# Patient Record
Sex: MALE | URBAN - METROPOLITAN AREA
[De-identification: names, ages, dates, MRNs, and addresses within clinical notes are randomized per-mention and may not be internally consistent; named-entity substitution may affect disease eponyms.]

---

## 2024-06-13 ENCOUNTER — LAB OUTSIDE AN ENCOUNTER (OUTPATIENT)
Dept: URBAN - METROPOLITAN AREA CLINIC 40 | Facility: CLINIC | Age: 49
End: 2024-06-13

## 2024-06-13 ENCOUNTER — DASHBOARD ENCOUNTERS (OUTPATIENT)
Age: 49
End: 2024-06-13

## 2024-06-13 ENCOUNTER — OFFICE VISIT (OUTPATIENT)
Dept: URBAN - METROPOLITAN AREA CLINIC 40 | Facility: CLINIC | Age: 49
End: 2024-06-13
Payer: COMMERCIAL

## 2024-06-13 VITALS
WEIGHT: 244.2 LBS | HEART RATE: 86 BPM | TEMPERATURE: 98.2 F | SYSTOLIC BLOOD PRESSURE: 120 MMHG | HEIGHT: 70 IN | DIASTOLIC BLOOD PRESSURE: 74 MMHG | BODY MASS INDEX: 34.96 KG/M2

## 2024-06-13 DIAGNOSIS — Z87.19 HISTORY OF HEMORRHOIDS: ICD-10-CM

## 2024-06-13 DIAGNOSIS — Z12.11 ENCOUNTER FOR SCREENING COLONOSCOPY: ICD-10-CM

## 2024-06-13 PROCEDURE — 99203 OFFICE O/P NEW LOW 30 MIN: CPT | Performed by: PHYSICIAN ASSISTANT

## 2024-06-13 RX ORDER — VALSARTAN AND HYDROCHLOROTHIAZIDE 160; 25 MG/1; MG/1
1 TABLET TABLET, FILM COATED ORAL ONCE A DAY
Status: ACTIVE | COMMUNITY

## 2024-06-13 RX ORDER — ESCITALOPRAM OXALATE 10 MG/1
1 TABLET TABLET, FILM COATED ORAL ONCE A DAY
Status: ACTIVE | COMMUNITY

## 2024-06-13 RX ORDER — ATORVASTATIN CALCIUM 80 MG/1
1 TABLET TABLET, FILM COATED ORAL ONCE A DAY
Status: ACTIVE | COMMUNITY

## 2024-06-13 NOTE — HPI-TODAY'S VISIT:
Mr. Bains is a 48-year-old white male presents to the office today to schedule his first colorectal cancer screening.  He has a history of hypertension, hypercholesterolemia, obesity.  No family history of colorectal cancer.  He has no complaint of abdominal pain changes in bowel habits but has had occasional episodes of painless rectal bleeding and does give a history of hemorrhoids.  Good appetite weight stable.  He remains very active, working in construction/ electrical work. PATIENT INSTRUCTIONS    Treatment:   Continue icing.        After Injection Instruction: keep the bandaid on for 24 hours, clean and dry.  Remove about 5 pm tomorrow and can then take a shower.  You have been given an injection (shot) at the [facility] department.  Injections are given into joints, tendons, or soft tissue for the treatment of pain and inflammation.  The medicine is a corticosteroid, but is often called a steroid or cortisone shot.  The steroid used was Kenalog.  A fast acting pain killer such as Lidocaine may be injected with the steroid to dull the pain for a few hours.  A long acting anesthetic, such as Marcaine, may also be injected with the steroid.  It may last up to 30 hours or wear off as soon as 6 hours.        · The steroid begins to work in 24 to 48 hours and may take 2 weeks to reach full effect.     · Take all of your regular medications, including pain medicine (unless instructed otherwise).     · Some increased pain may occur in the first 24 hours after the injection.  You may apply a cold pack or ice to the injected area for 15 to 20 minutes every 1 to 2 hours for 24 hours to lessen the pain.     · For people with diabetes, your blood sugar may be increased for 1 to 2 days. If you have any questions regarding your blood sugar, please contact your primary care physician.       Call your Orthopaedic physician if you develop any of the following symptoms:  · Fever  · Increased redness of injection site  · If it is not better in 2 weeks         Follow-Up:  Please make an appointment with  Dr. Kelley Torres in 2 weeks.  Your knee will also be evaluated at this time.             Time left: 2/5/2020 4:54 PM     Next appointment:        Location:        Provider:         Please note: 24 hour notice for cancellation of appointment is required.    You may receive a survey in the mail, or via the e-mail address that you have provided.  We would appreciate if you  could fill out the survey and provide us with any feedback on your experience regarding your visit today. Thank you for allowing us to provide you with your health care needs.     Do not hesitate to call if you are experiencing severe pain, worsening or change in your pain, have symptoms of infection (fever, warmth, redness, increased drainage), or have any other problem that concerns you ~ [phone] (or [phone] after hours).    Please remember when requesting refills on pain medication that the request should be made by Thursday at the latest. [facility] is open Monday-Friday, 8am-5pm, and closed on the weekends.  No narcotic refills will be filled after hours.    Additional Educational Resources:  For additional resources regarding your symptoms, diagnosis, or further health information, please visit the Health Resources section on [email or web address] or the Online Health Resources section in [email or web address].

## 2024-06-20 ENCOUNTER — OFFICE VISIT (OUTPATIENT)
Dept: URBAN - METROPOLITAN AREA SURGERY CENTER 30 | Facility: SURGERY CENTER | Age: 49
End: 2024-06-20
Payer: COMMERCIAL

## 2024-06-20 DIAGNOSIS — K57.30 ACQUIRED DIVERTICULOSIS OF COLON: ICD-10-CM

## 2024-06-20 DIAGNOSIS — Z12.11 COLON CANCER SCREENING: ICD-10-CM

## 2024-06-20 DIAGNOSIS — K64.8 EXTERNAL HEMORRHOIDS: ICD-10-CM

## 2024-06-20 PROCEDURE — G0121 COLON CA SCRN NOT HI RSK IND: HCPCS | Performed by: INTERNAL MEDICINE

## 2024-06-20 PROCEDURE — 00812 ANES LWR INTST SCR COLSC: CPT | Performed by: NURSE ANESTHETIST, CERTIFIED REGISTERED

## 2024-08-02 ENCOUNTER — OFFICE VISIT (OUTPATIENT)
Dept: URBAN - METROPOLITAN AREA TELEHEALTH 2 | Facility: TELEHEALTH | Age: 49
End: 2024-08-02
Payer: COMMERCIAL

## 2024-08-02 VITALS — BODY MASS INDEX: 34.36 KG/M2 | HEIGHT: 70 IN | WEIGHT: 240 LBS

## 2024-08-02 DIAGNOSIS — K57.30 DIVERTICULA OF COLON: ICD-10-CM

## 2024-08-02 PROCEDURE — 99441 PHONE E/M BY PHYS 5-10 MIN: CPT | Performed by: PHYSICIAN ASSISTANT

## 2024-08-02 RX ORDER — ATORVASTATIN CALCIUM 80 MG/1
1 TABLET TABLET, FILM COATED ORAL ONCE A DAY
Status: ACTIVE | COMMUNITY

## 2024-08-02 RX ORDER — VALSARTAN AND HYDROCHLOROTHIAZIDE 160; 25 MG/1; MG/1
1 TABLET TABLET, FILM COATED ORAL ONCE A DAY
Status: ACTIVE | COMMUNITY

## 2024-08-02 RX ORDER — ESCITALOPRAM OXALATE 10 MG/1
1 TABLET TABLET, FILM COATED ORAL ONCE A DAY
Status: ACTIVE | COMMUNITY

## 2024-08-02 NOTE — HPI-TODAY'S VISIT:
Mr. Bains is a 48-year-old white male presents to the office today to schedule his first colorectal cancer screening.  He has a history of hypertension, hypercholesterolemia, obesity.  No family history of colorectal cancer.  He has no complaint of abdominal pain changes in bowel habits but has had occasional episodes of painless rectal bleeding and does give a history of hemorrhoids.  Good appetite weight stable.  He remains very active, working in construction/ electrical work. Patient did have colonoscopy  June 20th 2024 with findings of nonbleeding internal hemorrhoids and scattered diverticula of the left colon, transverse colon.  A 10-year screening recommended as no evidence of neoplasia. Patient doing well following procedure with no complaints.  States that over the years he has had intermittent rectal discomfort and pain that he attributes to a possible fissure.  This was not examined or discussed with his prior visit.  No recent anal or rectal surgeries.  States that he believes this happens and flares when he is riding around or engaging in strenuous activity.  He has switched to a fragrance free wipes for cleaning and this seems to help.  No current rectal bleeding.  Denies any abdominal pain.